# Patient Record
Sex: FEMALE | Race: WHITE | NOT HISPANIC OR LATINO | Employment: FULL TIME | URBAN - METROPOLITAN AREA
[De-identification: names, ages, dates, MRNs, and addresses within clinical notes are randomized per-mention and may not be internally consistent; named-entity substitution may affect disease eponyms.]

---

## 2019-07-19 ENCOUNTER — OFFICE VISIT (OUTPATIENT)
Dept: FAMILY MEDICINE CLINIC | Facility: CLINIC | Age: 44
End: 2019-07-19

## 2019-07-19 VITALS
HEIGHT: 66 IN | TEMPERATURE: 98.1 F | WEIGHT: 146 LBS | SYSTOLIC BLOOD PRESSURE: 110 MMHG | DIASTOLIC BLOOD PRESSURE: 60 MMHG | BODY MASS INDEX: 23.46 KG/M2

## 2019-07-19 DIAGNOSIS — R05.3 CHRONIC COUGH: Primary | ICD-10-CM

## 2019-07-19 DIAGNOSIS — Z11.3 ROUTINE SCREENING FOR STI (SEXUALLY TRANSMITTED INFECTION): ICD-10-CM

## 2019-07-19 LAB
HCV AB SER DONR QL: NORMAL
HIV1+2 AB SER QL: NORMAL

## 2019-07-19 PROCEDURE — 86706 HEP B SURFACE ANTIBODY: CPT | Performed by: FAMILY MEDICINE

## 2019-07-19 PROCEDURE — 86707 HEPATITIS BE ANTIBODY: CPT | Performed by: FAMILY MEDICINE

## 2019-07-19 PROCEDURE — 86803 HEPATITIS C AB TEST: CPT | Performed by: FAMILY MEDICINE

## 2019-07-19 PROCEDURE — G0432 EIA HIV-1/HIV-2 SCREEN: HCPCS | Performed by: FAMILY MEDICINE

## 2019-07-19 PROCEDURE — 87350 HEPATITIS BE AG IA: CPT | Performed by: FAMILY MEDICINE

## 2019-07-19 PROCEDURE — 86704 HEP B CORE ANTIBODY TOTAL: CPT | Performed by: FAMILY MEDICINE

## 2019-07-19 PROCEDURE — 99204 OFFICE O/P NEW MOD 45 MIN: CPT | Performed by: FAMILY MEDICINE

## 2019-07-19 PROCEDURE — 87340 HEPATITIS B SURFACE AG IA: CPT | Performed by: FAMILY MEDICINE

## 2019-07-19 PROCEDURE — 86705 HEP B CORE ANTIBODY IGM: CPT | Performed by: FAMILY MEDICINE

## 2019-07-19 PROCEDURE — 86592 SYPHILIS TEST NON-TREP QUAL: CPT | Performed by: FAMILY MEDICINE

## 2019-07-19 RX ORDER — PRENATAL VIT NO.126/IRON/FOLIC 28MG-0.8MG
1 TABLET ORAL DAILY
COMMUNITY
End: 2020-01-10

## 2019-07-19 RX ORDER — MONTELUKAST SODIUM 10 MG/1
10 TABLET ORAL NIGHTLY
Qty: 30 TABLET | Refills: 2 | Status: SHIPPED | OUTPATIENT
Start: 2019-07-19 | End: 2020-01-10

## 2019-07-19 RX ORDER — FLUTICASONE PROPIONATE 50 MCG
2 SPRAY, SUSPENSION (ML) NASAL DAILY
Qty: 1 BOTTLE | Refills: 2 | Status: SHIPPED | OUTPATIENT
Start: 2019-07-19 | End: 2020-01-10

## 2019-07-19 NOTE — PROGRESS NOTES
Celina Melendez presents today to establish care.    Chief Complaint   Patient presents with   • Establish Care     New patient she comes in today to be seen for a cough x several months   • Cough        Cough   This is a new problem. The current episode started more than 1 month ago.      Cough for months and while pregnant. Had bronchitis once. Cough has been as far back as working for [employer] and had reaction to uniform. When she switched to [employer] cough took 4 weeks to go away. Thought allergies in the past. Non-productive cough. Tried increasing water. Thought could be nervous tics. First divorce had a cough. Now having issues with son's father. Doesn't have sneezing, postnasal drainage or allergies. Cough after exercise but no problems exercising with . No problems walking. Thought could be asthma but didn't have inhaler. Tried flonase, claritin helped a little bit.     Previous partner cheated on her and she was told to have repeat STI screening 6 months. She has upcoming appt with GYN to further discuss chronic pelvic pain. Current clear vaginal discharge. She has a h/o BV treated with [facility].     Review of Systems   Constitutional: Negative.    HENT: Negative.    Eyes: Negative.    Respiratory: Positive for cough.    Cardiovascular: Negative.    Gastrointestinal: Positive for constipation.   Endocrine: Negative.    Genitourinary: Positive for pelvic pain and vaginal discharge ( clear).   Musculoskeletal: Negative.    Skin: Negative.    Neurological: Negative.    Hematological: Negative.    Psychiatric/Behavioral: Negative.       PHQ-2/PHQ-9 Depression Screening 2019   Little interest or pleasure in doing things 0   Feeling down, depressed, or hopeless 0   Total Score 0       Past Medical History:   Diagnosis Date   • [place] product of in vitro fertilization (IVF) pregnancy    • Ovarian cyst    • Scarlet fever         Past Surgical History:   Procedure Laterality Date  "  •  SECTION  2019   • OVARIAN CYST REMOVAL  2002        Family History   Problem Relation Age of Onset   • Lung cancer Maternal Grandmother    • Diabetes Paternal Grandfather    • Lymphoma Paternal Grandfather    • Atrial fibrillation Mother    • Heart attack Neg Hx    • Stroke Neg Hx         Social History     Socioeconomic History   • Marital status:      Spouse name: Not on file   • Number of children: Not on file   • Years of education: Not on file   • Highest education level: Not on file   Occupational History   • Occupation:    Tobacco Use   • Smoking status: Former Smoker     Years: 10.00   • Smokeless tobacco: Never Used   Substance and Sexual Activity   • Alcohol use: No     Frequency: Never   • Drug use: No   • Sexual activity: Defer        Current Outpatient Medications on File Prior to Visit   Medication Sig Dispense Refill   • Prenatal Vit-Fe Fumarate-FA (PRENATAL, CLASSIC, VITAMIN) 28-0.8 MG tablet tablet Take 1 tablet by mouth Daily.       No current facility-administered medications on file prior to visit.        Allergies   Allergen Reactions   • Erythromycin Hives   • Hydrocodone GI Intolerance        Visit Vitals  /60   Temp 98.1 °F (36.7 °C)   Ht 166.4 cm (65.5\")   Wt 66.2 kg (146 lb)   Breastfeeding? Yes   BMI 23.93 kg/m²        Physical Exam   Constitutional: She is oriented to person, place, and time. No distress.   HENT:   Nose: Mucosal edema ( moderate) present.   Mouth/Throat: Mucous membranes are normal. No oral lesions. Posterior oropharyngeal erythema present. Tonsils are 0 on the right. Tonsils are 0 on the left.   Eyes: Conjunctivae are normal. Pupils are equal, round, and reactive to light.   Neck: Neck supple. No thyromegaly present.   Cardiovascular: Normal rate and regular rhythm.   No murmur heard.  Pulses:       Posterior tibial pulses are 2+ on the right side, and 2+ on the left side.   Pulmonary/Chest: Effort normal and breath sounds " normal.   Non-productive slight cough   Abdominal: Soft. There is no hepatosplenomegaly. There is no tenderness.   Genitourinary:   Genitourinary Comments: No suprapubic tenderness.    Lymphadenopathy:        Head (right side): No submandibular, no preauricular and no posterior auricular adenopathy present.        Head (left side): No preauricular and no posterior auricular adenopathy present.     She has no cervical adenopathy.   Neurological: She is alert and oriented to person, place, and time.   Skin: Skin is warm and dry. No rash noted.   Psychiatric: She has a normal mood and affect.   Vitals reviewed.            Celina was seen today for establish care and cough.  Prior records reviewed in Care Everywhere.   Diagnoses and all orders for this visit:    Chronic cough  -     montelukast (SINGULAIR) 10 MG tablet; Take 1 tablet by mouth Every Night.  -     fluticasone (FLONASE) 50 MCG/ACT nasal spray; 2 sprays into the nostril(s) as directed by provider Daily.  Suspect allergies based on HEENT exam. Restart flonase and loratadine. If not improving start singulair.   Routine screening for STI (sexually transmitted infection)  -     NuSwab VG+ - Swab, Vagina; Future  -     RPR; Future  -     HIV-1 / O / 2 Ag / Antibody 4th Generation; Future  -     Hepatitis B Virus Profile; Future  -     Hepatitis C Antibody; Future  -     RPR  -     HIV-1 / O / 2 Ag / Antibody 4th Generation  -     Hepatitis B Virus Profile  -     Hepatitis C Antibody  Check labs today. Keep f/u appt with GYN.       Return if symptoms worsen or fail to improve.

## 2019-07-20 LAB — RPR SER QL: NORMAL

## 2019-07-22 ENCOUNTER — LAB (OUTPATIENT)
Dept: LAB | Facility: HOSPITAL | Age: 44
End: 2019-07-22

## 2019-07-22 DIAGNOSIS — Z11.3 ROUTINE SCREENING FOR STI (SEXUALLY TRANSMITTED INFECTION): ICD-10-CM

## 2019-07-22 LAB
HBV CORE AB SER DONR QL IA: NEGATIVE
HBV CORE IGM SERPL QL IA: NEGATIVE
HBV E AB SERPL QL IA: NEGATIVE
HBV E AG SERPL QL IA: NEGATIVE
HBV SURFACE AB SER QL: NON REACTIVE
HBV SURFACE AG SERPL QL IA: NEGATIVE

## 2019-07-22 PROCEDURE — 87798 DETECT AGENT NOS DNA AMP: CPT | Performed by: FAMILY MEDICINE

## 2019-07-22 PROCEDURE — 87591 N.GONORRHOEAE DNA AMP PROB: CPT | Performed by: FAMILY MEDICINE

## 2019-07-22 PROCEDURE — 87491 CHLMYD TRACH DNA AMP PROBE: CPT | Performed by: FAMILY MEDICINE

## 2019-07-22 PROCEDURE — 87661 TRICHOMONAS VAGINALIS AMPLIF: CPT | Performed by: FAMILY MEDICINE

## 2019-07-22 PROCEDURE — 87801 DETECT AGNT MULT DNA AMPLI: CPT | Performed by: FAMILY MEDICINE

## 2019-07-26 LAB
A VAGINAE DNA VAG QL NAA+PROBE: NORMAL SCORE
BVAB2 DNA VAG QL NAA+PROBE: NORMAL SCORE
C ALBICANS DNA VAG QL NAA+PROBE: NEGATIVE
C GLABRATA DNA VAG QL NAA+PROBE: NEGATIVE
C TRACH RRNA SPEC DONR QL NAA+PROBE: NEGATIVE
MEGASPHAERA 1: NORMAL SCORE
N GONORRHOEA DNA SPEC QL NAA+PROBE: NEGATIVE
T VAGINALIS RRNA GENITAL QL PROBE: NEGATIVE

## 2019-07-31 ENCOUNTER — HOSPITAL ENCOUNTER (OUTPATIENT)
Dept: LACTATION | Facility: HOSPITAL | Age: 44
Discharge: HOME | End: 2019-07-31

## 2019-08-05 ENCOUNTER — HOSPITAL ENCOUNTER (OUTPATIENT)
Dept: LACTATION | Facility: HOSPITAL | Age: 44
Discharge: HOME | End: 2019-08-05

## 2019-08-05 NOTE — LACTATION NOTE
Dr. Vega,  Baby Andres transferred 3.5 ounces(100 mls) total from both breasts and actually seem content. At his past visits he has been fussy after nursing and is usually sucking on his fingers as if he is still hungry. But today he was smiling at me and appeared content.  I told Celina I would prefer him to get a total of 4 ounces every 2-3 hours but that was closer than we have ever gotten to the 4 ounces.  At last week's visit, he transferred 1.5-2.0 ounces which was somewhat low.  Celina states she has been waking Nayla ever 2-3 hours for nursing to get that amount of weight on him.  I'm not sure if she can continue that kind of routine  because that is the work of a  and seems like a lot for a 4.5 month old.  Last week (, Andres weighed 12 pounds and 4 ounces and today, 5 days later he weighed 12 pounds 8 ounces ( a total of 4 ounces in 5 days).

## 2020-01-09 ENCOUNTER — TELEPHONE (OUTPATIENT)
Dept: FAMILY MEDICINE CLINIC | Facility: CLINIC | Age: 45
End: 2020-01-09

## 2020-01-09 NOTE — TELEPHONE ENCOUNTER
I've never ordered heavy metals, I'm not sure if her insurance will cover it. She may have to talk to the lab.

## 2020-01-09 NOTE — TELEPHONE ENCOUNTER
Patient states that she is a  and and they are having problems with their uniforms and she needs baseline heavy metal testing before she wears her uniform again.  She has an appt tomorrow with Dr. Garcia.  She was wondering if this testing can be done.

## 2020-01-09 NOTE — TELEPHONE ENCOUNTER
Contacted patient she states she has been off from work for baby bonding for the past year. She would like to have this testing done before she returns to work.     I advised her that as long as she knows which metals she would like to be tested for she can have this done tomorrow at her visit.

## 2020-01-10 ENCOUNTER — OFFICE VISIT (OUTPATIENT)
Dept: FAMILY MEDICINE CLINIC | Facility: CLINIC | Age: 45
End: 2020-01-10

## 2020-01-10 VITALS
WEIGHT: 142.4 LBS | SYSTOLIC BLOOD PRESSURE: 108 MMHG | HEART RATE: 54 BPM | HEIGHT: 66 IN | BODY MASS INDEX: 22.88 KG/M2 | OXYGEN SATURATION: 97 % | DIASTOLIC BLOOD PRESSURE: 64 MMHG

## 2020-01-10 DIAGNOSIS — Z12.83 SCREENING EXAM FOR SKIN CANCER: ICD-10-CM

## 2020-01-10 DIAGNOSIS — Z13.29 SCREENING FOR THYROID DISORDER: ICD-10-CM

## 2020-01-10 DIAGNOSIS — Z13.1 SCREENING FOR DIABETES MELLITUS: ICD-10-CM

## 2020-01-10 DIAGNOSIS — Z56.9 ADVERSE EXPOSURE IN WORKPLACE: ICD-10-CM

## 2020-01-10 DIAGNOSIS — Z00.00 WELL ADULT EXAM: Primary | ICD-10-CM

## 2020-01-10 DIAGNOSIS — Z13.220 SCREENING, LIPID: ICD-10-CM

## 2020-01-10 DIAGNOSIS — Z13.0 SCREENING FOR DEFICIENCY ANEMIA: ICD-10-CM

## 2020-01-10 LAB
ALBUMIN SERPL-MCNC: 4.2 G/DL (ref 3.5–5.2)
ALBUMIN/GLOB SERPL: 1.7 G/DL
ALP SERPL-CCNC: 83 U/L (ref 39–117)
ALT SERPL W P-5'-P-CCNC: 22 U/L (ref 1–33)
ANION GAP SERPL CALCULATED.3IONS-SCNC: 10.9 MMOL/L (ref 5–15)
AST SERPL-CCNC: 16 U/L (ref 1–32)
BASOPHILS # BLD AUTO: 0.02 10*3/MM3 (ref 0–0.2)
BASOPHILS NFR BLD AUTO: 0.5 % (ref 0–1.5)
BILIRUB SERPL-MCNC: 0.4 MG/DL (ref 0.2–1.2)
BUN BLD-MCNC: 12 MG/DL (ref 6–20)
BUN/CREAT SERPL: 14.5 (ref 7–25)
CALCIUM SPEC-SCNC: 9.2 MG/DL (ref 8.6–10.5)
CHLORIDE SERPL-SCNC: 106 MMOL/L (ref 98–107)
CHOLEST SERPL-MCNC: 147 MG/DL (ref 0–200)
CO2 SERPL-SCNC: 26.1 MMOL/L (ref 22–29)
CREAT BLD-MCNC: 0.83 MG/DL (ref 0.57–1)
DEPRECATED RDW RBC AUTO: 39.6 FL (ref 37–54)
EOSINOPHIL # BLD AUTO: 0.06 10*3/MM3 (ref 0–0.4)
EOSINOPHIL NFR BLD AUTO: 1.5 % (ref 0.3–6.2)
ERYTHROCYTE [DISTWIDTH] IN BLOOD BY AUTOMATED COUNT: 12.3 % (ref 12.3–15.4)
GFR SERPL CREATININE-BSD FRML MDRD: 74 ML/MIN/1.73
GLOBULIN UR ELPH-MCNC: 2.5 GM/DL
GLUCOSE BLD-MCNC: 91 MG/DL (ref 65–99)
HCT VFR BLD AUTO: 42 % (ref 34–46.6)
HDLC SERPL-MCNC: 62 MG/DL (ref 40–60)
HGB BLD-MCNC: 14.3 G/DL (ref 12–15.9)
IMM GRANULOCYTES # BLD AUTO: 0 10*3/MM3 (ref 0–0.05)
IMM GRANULOCYTES NFR BLD AUTO: 0 % (ref 0–0.5)
LDLC SERPL CALC-MCNC: 78 MG/DL (ref 0–100)
LDLC/HDLC SERPL: 1.26 {RATIO}
LYMPHOCYTES # BLD AUTO: 0.99 10*3/MM3 (ref 0.7–3.1)
LYMPHOCYTES NFR BLD AUTO: 24.3 % (ref 19.6–45.3)
MCH RBC QN AUTO: 30 PG (ref 26.6–33)
MCHC RBC AUTO-ENTMCNC: 34 G/DL (ref 31.5–35.7)
MCV RBC AUTO: 88.2 FL (ref 79–97)
MONOCYTES # BLD AUTO: 0.21 10*3/MM3 (ref 0.1–0.9)
MONOCYTES NFR BLD AUTO: 5.2 % (ref 5–12)
NEUTROPHILS # BLD AUTO: 2.79 10*3/MM3 (ref 1.7–7)
NEUTROPHILS NFR BLD AUTO: 68.5 % (ref 42.7–76)
NRBC BLD AUTO-RTO: 0 /100 WBC (ref 0–0.2)
PLATELET # BLD AUTO: 212 10*3/MM3 (ref 140–450)
PMV BLD AUTO: 11.5 FL (ref 6–12)
POTASSIUM BLD-SCNC: 4.9 MMOL/L (ref 3.5–5.2)
PROT SERPL-MCNC: 6.7 G/DL (ref 6–8.5)
RBC # BLD AUTO: 4.76 10*6/MM3 (ref 3.77–5.28)
SODIUM BLD-SCNC: 143 MMOL/L (ref 136–145)
TRIGL SERPL-MCNC: 34 MG/DL (ref 0–150)
TSH SERPL DL<=0.05 MIU/L-ACNC: 1.73 UIU/ML (ref 0.27–4.2)
VLDLC SERPL-MCNC: 6.8 MG/DL (ref 5–40)
WBC NRBC COR # BLD: 4.07 10*3/MM3 (ref 3.4–10.8)

## 2020-01-10 PROCEDURE — 80061 LIPID PANEL: CPT | Performed by: FAMILY MEDICINE

## 2020-01-10 PROCEDURE — 36415 COLL VENOUS BLD VENIPUNCTURE: CPT | Performed by: FAMILY MEDICINE

## 2020-01-10 PROCEDURE — 80053 COMPREHEN METABOLIC PANEL: CPT | Performed by: FAMILY MEDICINE

## 2020-01-10 PROCEDURE — 84443 ASSAY THYROID STIM HORMONE: CPT | Performed by: FAMILY MEDICINE

## 2020-01-10 PROCEDURE — 83655 ASSAY OF LEAD: CPT | Performed by: FAMILY MEDICINE

## 2020-01-10 PROCEDURE — 99396 PREV VISIT EST AGE 40-64: CPT | Performed by: FAMILY MEDICINE

## 2020-01-10 PROCEDURE — 85025 COMPLETE CBC W/AUTO DIFF WBC: CPT | Performed by: FAMILY MEDICINE

## 2020-01-10 PROCEDURE — 82300 ASSAY OF CADMIUM: CPT | Performed by: FAMILY MEDICINE

## 2020-01-10 PROCEDURE — 83825 ASSAY OF MERCURY: CPT | Performed by: FAMILY MEDICINE

## 2020-01-10 PROCEDURE — 82175 ASSAY OF ARSENIC: CPT | Performed by: FAMILY MEDICINE

## 2020-01-10 SDOH — ECONOMIC STABILITY - INCOME SECURITY: UNSPECIFIED PROBLEMS RELATED TO EMPLOYMENT: Z56.9

## 2020-01-10 NOTE — PROGRESS NOTES
Celina Melendez presents today for a physical    Chief Complaint   Patient presents with   • Annual Exam     would like to discuss heavy metals testing        HPI     Works for [employer]. 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 Uniforms sprayed with different things for wrinkle resistant. Some things formaldehyde, arsenic, cadmium, chromium, mercury lead, nickel, antimony people are tested for.    She has been off work for year and a half for baby bonding, She wants to have baseline level now to see if any problems arise going back to work for off gassing. She used to work for [employer] and they had problem with uniforms. She had chronic respiratory issues choking couldn't 't breathe when she was in uniform, symptoms improved some except for cough when out of uniform.     She had flu vaccine this season.    She is currently breast-feeding.  She takes a multivitamin regularly.        Review of Systems   Constitutional: Negative for fatigue.   HENT: Negative for congestion and rhinorrhea.    Eyes: Negative for visual disturbance.   Respiratory: Negative for cough and shortness of breath.    Cardiovascular: Negative for chest pain and palpitations.   Gastrointestinal: Negative for abdominal pain, constipation and diarrhea.   Endocrine: Positive for cold intolerance.   Genitourinary: Negative for frequency.   Musculoskeletal: Negative for arthralgias and back pain.   Skin: Negative for rash.   Neurological: Negative for dizziness and headache.   Hematological: Does not bruise/bleed easily.   Psychiatric/Behavioral: Negative for dysphoric mood and sleep disturbance. The patient is not nervous/anxious.         Past Medical History:   Diagnosis Date   • [place] product of in vitro fertilization (IVF) pregnancy    • Ovarian cyst    • Scarlet  "fever         Past Surgical History:   Procedure Laterality Date   •  SECTION  2019   • OVARIAN CYST REMOVAL          Family History   Problem Relation Age of Onset   • Lung cancer Maternal Grandmother    • Diabetes Paternal Grandfather    • Lymphoma Paternal Grandfather    • Atrial fibrillation Mother    • No Known Problems Father    • Heart attack Neg Hx    • Stroke Neg Hx         Social History     Socioeconomic History   • Marital status:      Spouse name: Not on file   • Number of children: Not on file   • Years of education: Not on file   • Highest education level: Not on file   Occupational History   • Occupation:    Tobacco Use   • Smoking status: Former Smoker     Years: 10.00   • Smokeless tobacco: Never Used   Substance and Sexual Activity   • Alcohol use: No     Frequency: Never   • Drug use: No   • Sexual activity: Defer        Current Outpatient Medications on File Prior to Visit   Medication Sig Dispense Refill   • Multiple Vitamins-Minerals (MULTIVITAMIN ADULT PO) Take  by mouth.     • [DISCONTINUED] fluticasone (FLONASE) 50 MCG/ACT nasal spray 2 sprays into the nostril(s) as directed by provider Daily. 1 bottle 2   • [DISCONTINUED] montelukast (SINGULAIR) 10 MG tablet Take 1 tablet by mouth Every Night. 30 tablet 2   • [DISCONTINUED] Prenatal Vit-Fe Fumarate-FA (PRENATAL, CLASSIC, VITAMIN) 28-0.8 MG tablet tablet Take 1 tablet by mouth Daily.       No current facility-administered medications on file prior to visit.        Allergies   Allergen Reactions   • Erythromycin Hives   • Hydrocodone GI Intolerance        Visit Vitals  /64 (BP Location: Left arm, Patient Position: Sitting, Cuff Size: Adult)   Pulse 54   Ht 166.4 cm (65.5\")   Wt 64.6 kg (142 lb 6.4 oz)   SpO2 97%   BMI 23.34 kg/m²        Patient's Body mass index is 23.34 kg/m². BMI is within normal parameters. No follow-up required..      Physical Exam   Constitutional: She is oriented to " person, place, and time. No distress.   HENT:   Nose: Nose normal.   Mouth/Throat: Oropharynx is clear and moist.   Eyes: Pupils are equal, round, and reactive to light. Conjunctivae are normal.   Neck: Neck supple. No thyromegaly present.   Cardiovascular: Normal rate and regular rhythm.   No murmur heard.  Pulses:       Posterior tibial pulses are 2+ on the right side, and 2+ on the left side.   Pulmonary/Chest: Effort normal and breath sounds normal.   Abdominal: Soft. There is no hepatosplenomegaly. There is no tenderness.   Lymphadenopathy:     She has no cervical adenopathy.   Neurological: She is alert and oriented to person, place, and time.   Skin: Skin is warm and dry. No rash noted.   Abrasion to right nose without active bleeding.   Psychiatric: She has a normal mood and affect.   Vitals reviewed.            Immunization History   Administered Date(s) Administered   • Flu Vaccine Quad PF >18YRS 11/19/2018   • Tdap 03/17/2019       Health Maintenance   Topic Date Due   • PAP SMEAR  07/19/2019   • INFLUENZA VACCINE  08/01/2019   • TDAP/TD VACCINES (2 - Td) 03/17/2029       Celina was seen today for annual exam.    Diagnoses and all orders for this visit:    Well adult exam  -     CBC & Differential; Future  -     Comprehensive Metabolic Panel; Future  -     Lipid Panel; Future  -     TSH Rfx On Abnormal To Free T4; Future  Check labs today.  Obtain immunization records.  Adverse exposure in workplace  -     Heavy Metals, Blood; Future  Patient requested testing.  Screening, lipid  -     Lipid Panel; Future    Screening for diabetes mellitus  -     Comprehensive Metabolic Panel; Future    Screening for deficiency anemia  -     CBC & Differential; Future    Screening for thyroid disorder  -     TSH Rfx On Abnormal To Free T4; Future    Screening exam for skin cancer  -     Ambulatory Referral to Dermatology  Patient interested in full skin exam, referral placed.      Counseled on health maintenance topics and  preventative care recommendations: Influenza vaccine, screening for diabetes, screening for cholesterol, screening for thyroid function, screening for anemia, multivitamin use     Return in about 1 year (around 1/10/2021) for Physical.    Dr. Sakina Garcia

## 2020-01-14 LAB
ARSENIC BLD-MCNC: 7 UG/L (ref 2–23)
CADMIUM BLD-MCNC: NORMAL UG/L (ref 0–1.2)
LEAD BLD-MCNC: NORMAL UG/DL (ref 0–4)
MERCURY BLD-MCNC: 2.2 UG/L (ref 0–14.9)

## 2022-08-31 ENCOUNTER — HOSPITAL ENCOUNTER (OUTPATIENT)
Dept: RADIOLOGY | Facility: CLINIC | Age: 47
Discharge: HOME | End: 2022-08-31

## 2022-08-31 DIAGNOSIS — M79.674 PAIN IN TOE OF RIGHT FOOT: ICD-10-CM

## 2022-08-31 PROCEDURE — 73630 X-RAY EXAM OF FOOT: CPT | Performed by: NURSE PRACTITIONER

## 2022-09-01 NOTE — PROGRESS NOTES
Narrative & Impression  Examination: XR FOOT 3+ VW RIGHT-    Date of Exam: 8/31/2022 7:06 PM    Indication: Cracked right small toe on table last night causing pain,  swelling, and difficulty bearing weight on right foot.; M79.674-Pain in  right toe(s).    Comparison: None available.    Technique: Three radiographic views of the right foot were obtained.    Findings:  There is an acute avulsion fracture at the medial base of the fifth  digit proximal phalanx displaced non-2 mm. This extends into the joint  space. There is moderate Achilles and mild plantar calcaneal  enthesopathy and there are small dorsal midfoot and first MTP and IP  osteophyte. No erosions.    IMPRESSION:  1 acute avulsion fracture at the base of the fifth digit proximal  phalanx extending into the MTP joint.  2. Degenerative changes with large Achilles enthesophyte.    This report was finalized on 9/1/2022 7:18 AM by Trung Lopez MD.

## 2022-09-09 ENCOUNTER — OFFICE VISIT (OUTPATIENT)
Dept: ORTHOPEDIC SURGERY | Facility: CLINIC | Age: 47
End: 2022-09-09

## 2022-09-09 VITALS
BODY MASS INDEX: 22.98 KG/M2 | DIASTOLIC BLOOD PRESSURE: 60 MMHG | WEIGHT: 143 LBS | HEIGHT: 66 IN | SYSTOLIC BLOOD PRESSURE: 116 MMHG

## 2022-09-09 DIAGNOSIS — S92.511A CLOSED DISPLACED FRACTURE OF PROXIMAL PHALANX OF LESSER TOE OF RIGHT FOOT, INITIAL ENCOUNTER: Primary | ICD-10-CM

## 2022-09-09 PROCEDURE — 99204 OFFICE O/P NEW MOD 45 MIN: CPT | Performed by: ORTHOPAEDIC SURGERY

## 2022-09-09 NOTE — PROGRESS NOTES
"NEW PATIENT    Patient: Celina Melendez  : 1975    Primary Care Provider: Sakina Palafox MD    Requesting Provider: As above    Pain of the Right Foot      History    Chief Complaint: Right toe fracture    History of Present Illness: This is an extremely pleasant 47-year-old woman with a right fifth toe fracture.  She was moving quickly in her house and jammed the toe against a hard object on 2022.  She had pain swelling and ecchymosis.  She was seen at urgent treatment on 2022, found to have a slightly displaced medial base of fifth proximal phalanx fracture.  She was given a postop shoe.  She is here for evaluation.  She reports the pain has improved, the swelling has improved, the ecchymosis is dissipating.  I reviewed the films and the report.  My interpretation is slightly displaced proximal medial base of fifth proximal phalanx fracture.  The report also mentions osteophytes on the calcaneus, she notes that she has had \"pump bumps\" for a long time, they are not symptomatic.  I explained there are common x-ray finding.  She is a , and she would like to go back to work, but she needs a note for this.  They will not let her fly in a postop shoe.  She rates the pain as 4 out of 10.      The patient does not have a personal or family history of DVT, PE, hypercoagulable state or risk factors for clotting.        Current Outpatient Medications on File Prior to Visit   Medication Sig Dispense Refill   • Multiple Vitamins-Minerals (MULTIVITAMIN ADULT PO) Take  by mouth.     • SUMAtriptan (IMITREX) 100 MG tablet sumatriptan 100 mg tablet   1 po at onset of headache may repeat in 2hours but no more than 200 mg in 2 days       No current facility-administered medications on file prior to visit.      Allergies   Allergen Reactions   • Erythromycin Hives and Nausea And Vomiting   • Hydrocodone GI Intolerance and Hives      Past Medical History:   Diagnosis Date   • " "Infertility, female    •  product of in vitro fertilization (IVF) pregnancy    • Ovarian cyst    • Scarlet fever      Past Surgical History:   Procedure Laterality Date   •  SECTION  2019   • OVARIAN CYST REMOVAL       Family History   Problem Relation Age of Onset   • Lung cancer Maternal Grandmother    • Diabetes Paternal Grandfather    • Lymphoma Paternal Grandfather    • Atrial fibrillation Mother    • No Known Problems Father    • Heart attack Neg Hx    • Stroke Neg Hx       Social History     Socioeconomic History   • Marital status:    Tobacco Use   • Smoking status: Former Smoker     Years: 10.00   • Smokeless tobacco: Never Used   Substance and Sexual Activity   • Alcohol use: No   • Drug use: No   • Sexual activity: Defer        Review of Systems   Constitutional: Negative.    HENT: Negative.    Eyes: Negative.    Respiratory: Negative.    Cardiovascular: Negative.    Gastrointestinal: Negative.    Endocrine: Negative.    Genitourinary: Negative.    Musculoskeletal: Positive for arthralgias.   Skin: Negative.    Allergic/Immunologic: Negative.    Neurological: Negative.    Hematological: Negative.    Psychiatric/Behavioral: Negative.        The following portions of the patient's history were reviewed and updated as appropriate: allergies, current medications, past family history, past medical history, past social history, past surgical history and problem list.    Physical Exam:   /60   Ht 166.4 cm (65.51\")   Wt 64.9 kg (143 lb)   BMI 23.43 kg/m²   GENERAL: Body habitus: normal weight for height    Lower extremity edema: Right: none; Left: none    Varicose veins:  Right: none; Left: none    Gait: normal     Mental Status:  awake and alert; oriented to person, place, and time    Voice:  clear  SKIN:  Lower extremity: Normal    Hair Growth(lower extremity):  Right:normal; Left:  normal  NAILS: Toenails: normal  HEENT: Head: Normocephalic, atraumatic,  without obvious " abnormality.  eye: normal external eye, no icterus  ears:normal external ears  PULM:  Repiratory effort normal  CV:  Dorsalis Pedis:  Right: 2+; Left:2+    Posterior Tibial: Right:2+; Left:2+    Capillary Refill:  Brisk  MSK:        Tibia:  Right:  non tender; Left:  non tender      Ankle:  Right: non tender; Left:  non tender      Foot:  Right:  Tender at the proximal aspect of the proximal phalanx of the fifth toe, mild swelling, no instability; Left:  non tender      NEURO:      [place] 5.07 monofilament test: normal    Lower extremity sensation: intact       Calf Atrophy:none    Motor Function: all 5/5         Medical Decision Making    Data Review:   reviewed radiology images, reviewed radiology results and reviewed outside records    Assessment and Plan/ Diagnosis/Treatment options:   1. Closed displaced fracture of proximal phalanx of lesser toe of right foot, initial encounter  The fracture is only minimally displaced.  I explained to her that these extremely rarely cause any problems.  I explained that it will heal with fibrous tissue and the fracture will be visible forever.  I explained have never had to operate on 1 in 29 years of only doing foot and ankle procedures.  I explained the is be sore for many months.  She will have swelling for months.  I explained that is just normal.  I think it is fine if she wants to go to work if she can wear her shoe.  I showed her how to kelby tape the toe to the fourth toe which will make it a bit more comfortable.  I will see her again in 8 to 10 weeks with x-ray of the toes, we gave her a note to go to work.            Part of this encounter note is an electronic transcription/translation of spoken language to printed text. The electronic translation of spoken language may permit erroneous, or at times, nonsensical words or phrases to be inadvertently transcribed; Although I have reviewed the note for such errors, some may still exist.          Page MORTON  MD [place]